# Patient Record
Sex: FEMALE | Race: BLACK OR AFRICAN AMERICAN | URBAN - METROPOLITAN AREA
[De-identification: names, ages, dates, MRNs, and addresses within clinical notes are randomized per-mention and may not be internally consistent; named-entity substitution may affect disease eponyms.]

---

## 2018-07-18 ENCOUNTER — OFFICE VISIT (OUTPATIENT)
Dept: OBGYN CLINIC | Age: 48
End: 2018-07-18

## 2018-07-18 VITALS
DIASTOLIC BLOOD PRESSURE: 85 MMHG | SYSTOLIC BLOOD PRESSURE: 130 MMHG | WEIGHT: 255 LBS | HEART RATE: 78 BPM | BODY MASS INDEX: 40.98 KG/M2 | HEIGHT: 66 IN

## 2018-07-18 DIAGNOSIS — R10.2 PELVIC PAIN IN FEMALE: Primary | ICD-10-CM

## 2018-07-18 PROBLEM — D21.9 FIBROIDS: Status: ACTIVE | Noted: 2018-07-18

## 2018-07-18 PROBLEM — E66.01 OBESITY, MORBID (HCC): Status: ACTIVE | Noted: 2018-07-18

## 2018-07-18 NOTE — MR AVS SNAPSHOT
[address] [facility] 
 
 
 [address] [id number] 
[phone] Patient: Philip Blancas MRN: [id number] FEJ:[birth year] Visit Information Date & Time Provider Department Dept. Phone Encounter #  
 7/18/2018 10:00 AM Karlton Fabry, [facility] OB/ [phone] Upcoming Health Maintenance Date Due  
 PAP AKA CERVICAL CYTOLOGY 4/3/1993 Influenza Age 5 to Adult 8/1/2018 Allergies as of 7/18/2018  Review Complete On: 7/18/2018 By: Karlton Fabry, MD  
 No Known Allergies Current Immunizations  Never Reviewed No immunizations on file. Not reviewed this visit Vitals BP Pulse Height(growth percentile) Weight(growth percentile) LMP BMI  
 130/85 78 5' 6\" (1.676 m) 255 lb (115.7 kg) 07/11/2018 (Exact Date) 41.16 kg/m2 Smoking Status Never Smoker BMI and BSA Data Body Mass Index Body Surface Area  
 41.16 kg/m 2 2.32 m 2 Your Updated Medication List  
  
Notice  As of 7/18/2018 11:13 AM  
 You have not been prescribed any medications. Introducing [facility]! [employer] introduces [employer] patient portal. Now you can access parts of your medical record, email your doctor's office, and request medication refills online. 1. In your internet browser, go to [email or web address] 2. Click on the First Time User? Click Here link in the Sign In box. You will see the New Member Sign Up page. 3. Enter your K[employer] Access Code exactly as it appears below. You will not need to use this code after youve completed the sign-up process. If you do not sign up before the expiration date, you must request a new code. · Kadoink Access Code: [email or web address] Expires: 10/16/2018  9:46 AM 
 
4. Enter the last four digits of your Social Security Number (xxxx) and Date of Birth (mm/dd/yyyy) as indicated and click Submit.  You will be taken to the next sign-up page. 5. Create a [employer] ID. This will be your [employer] login ID and cannot be changed, so think of one that is secure and easy to remember. 6. Create a [employer] password. You can change your password at any time. 7. Enter your Password Reset Question and Answer. This can be used at a later time if you forget your password. 8. Enter your e-mail address. You will receive e-mail notification when new information is available in [address] 9. Click Sign Up. You can now view and download portions of your medical record. 10. Click the Download Summary menu link to download a portable copy of your medical information. If you have questions, please visit the Frequently Asked Questions section of the [employer] website. Remember, [employer] is NOT to be used for urgent needs. For medical emergencies, dial 911. Now available from your iPhone and Android! Please provide this summary of care documentation to your next provider. If you have any questions after today's visit, please call [phone].

## 2018-07-18 NOTE — PROGRESS NOTES
58 y/o  presents to the office as a referral from her PCP for pelvic pain. She states she had waves of pain starting in May. She states she went PCP and she ordered tests to diagnose the pain. Ultrasound was ordered and performed and indicated her known fibroids were stable. She had embolization in . Follow-up MRI confirmed decreasing uterine volumes, which was confirmed. There has been no change in the interval size of her fibroids with the most dominant fibroid remains 2.3 cm. She denies any change in medications, activity or surgery. She isn't sexually active for the past several months. PCP work-up ruled out urinary cause. ABX was negative. ROS: intermittent pain, now resolved, difficulty inserting the tampon. + night sweats only. Normal, monthly cycles- described as light. Active Ambulatory Problems     Diagnosis Date Noted    Obesity, morbid ([address] 2018    Fibroids 2018     Resolved Ambulatory Problems     Diagnosis Date Noted    No Resolved Ambulatory Problems     Past Medical History:   Diagnosis Date    Fibroids 2018     Visit Vitals    /85    Pulse 78    Ht 5' 6\" (1.676 m)    Wt 255 lb (115.7 kg)    LMP 2018 (Exact Date)    BMI 41.16 kg/m2     Gen: A&Ox3, NAD  SVE: NEFG, cervix appearing. BME: mobile, non-tender uterus, 10 week size      Ultrasound: UTERUS: Normal in size measuring 8.2 x 5.5 x 4.3 cm. Multiple partially calcified fibroids are seen measuring up to 2.3 cm in size. ENDOMETRIUM: Normal in echotexture and thickness measuring 0.6 cm.    RIGHT OVARY and ADNEXA: Right ovary is normal in size, shape, and echotexture measuring 2.4 x 1.1 x 0.8 cm. 2.6 cm follicle noted. Color and spectral Doppler demonstrate normal flow to the right ovary with no evidence of ovarian torsion.  Arterial and venous waveforms are normal.    LEFT OVARY and ADNEXA: Left ovary is normal in size, shape, and echotexture measuring 3.6 x 2.6 x 1.7 cm. 1.5 cm follicle noted. Color and spectral Doppler demonstrate normal flow to the left ovary with no evidence of ovarian torsion. Arterial and venous waveforms are normal.    A/p:  Pelvic pain- nonspecific. Fibroids have not changed from prior scans. MRI reviewed. Recommend pain diary and RTO for WWE. The plan of care has been discussed with the patient. She has been given the opportunity to ask questions, which seem to have been answered satisfactorily.

## 2018-08-28 ENCOUNTER — OFFICE VISIT (OUTPATIENT)
Dept: OBGYN CLINIC | Age: 48
End: 2018-08-28

## 2018-08-28 ENCOUNTER — HOSPITAL ENCOUNTER (OUTPATIENT)
Dept: LAB | Age: 48
Discharge: HOME | End: 2018-08-28
Payer: COMMERCIAL

## 2018-08-28 VITALS
DIASTOLIC BLOOD PRESSURE: 84 MMHG | HEART RATE: 98 BPM | BODY MASS INDEX: 41.62 KG/M2 | WEIGHT: 259 LBS | HEIGHT: 66 IN | SYSTOLIC BLOOD PRESSURE: 130 MMHG

## 2018-08-28 DIAGNOSIS — Z01.419 ENCOUNTER FOR WELL WOMAN EXAM WITH ROUTINE GYNECOLOGICAL EXAM: Primary | ICD-10-CM

## 2018-08-28 DIAGNOSIS — N63.20 LEFT BREAST MASS: ICD-10-CM

## 2018-08-28 DIAGNOSIS — Z12.31 VISIT FOR SCREENING MAMMOGRAM: ICD-10-CM

## 2018-08-28 PROCEDURE — 87624 HPV HI-RISK TYP POOLED RSLT: CPT | Performed by: OBSTETRICS & GYNECOLOGY

## 2018-08-28 PROCEDURE — 88175 CYTOPATH C/V AUTO FLUID REDO: CPT | Performed by: OBSTETRICS & GYNECOLOGY

## 2018-08-28 NOTE — PATIENT INSTRUCTIONS
Learning About Menopause  What is menopause? For most women, menopause is a natural process of aging. Menstrual periods gradually stop. The ability to become pregnant ends. Some women feel relief that their childbearing years are ending. But other women struggle with the physical and emotional changes that come with menopause. For most women, menopause happens around age 48. But every woman's body has its own timeline. Some women stop having periods in their mid-40s. Others keep having periods well into their 50s. And some women go through menopause early because of cancer treatment or surgery to remove the ovaries. What can you expect with menopause? · It starts with perimenopause. This is the process of change that leads up to menopause. Perimenopause can start as early as your late 35s or as late as your early 46s. How long it lasts varies. But it usually lasts from 2 to 8 years. · During this time, your hormone levels will go up and down unevenly (fluctuate). This causes changes in your periods and other symptoms. In time, estrogen and progesterone levels drop enough that the menstrual cycle stops. Going a full year without having a period is usually considered menopause. · Low estrogen levels after menopause speed bone loss. This increases your risk of osteoporosis. Also, your risk of heart disease increases after menopause. · It's normal to have thinner, dryer, wrinkled skin after menopause. The vaginal lining and the lower urinary tract also thin and weaken. This can make it hard to have sex. It can also increase the risk of vaginal and urinary tract infections. What are the symptoms? · Lighter or heavier periods. Your menstrual cycle may be longer or shorter. You may skip periods. · Hot flashes. You may have a sudden feeling of intense body heat. You may sweat, and your head, neck, and chest may get red. Along with hot flashes, you may have a heartbeat that's too fast or not regular.  You may also feel anxious or grouchy. In rare cases you might feel panic. · Trouble sleeping. · Mood swings or feeling grouchy, depressed, or worried. · Problems with remembering or thinking clearly. · Vaginal dryness. Some women have only a few mild symptoms. Others have severe symptoms that disrupt their sleep and daily lives. Symptoms tend to last or get worse the first year or more after menopause. Over time, hormones even out at low levels. Many symptoms improve or go away. But some women may have symptoms that don't go away. How are menopause symptoms treated?   If you have mild symptoms, you may get some relief if you eat healthy foods, exercise, and lower your stress. Some women choose to take medicines if they have severe symptoms that aren't helped by making changes to their lifestyle.   Lifestyle changes    · Choose a heart-healthy diet that is low in saturated fat. It should include plenty of fish, fruits, vegetables, beans, and high-fiber grains and breads. Be sure you get enough calcium and vitamin D to help your bones stay strong. Low-fat or nonfat dairy products are a great source of calcium.     · Get regular exercise. Exercise can help you manage your weight, keep your heart and bones strong, and lift your mood.     · Limit caffeine, alcohol, and stress. These things can make symptoms worse. Limiting them may help you sleep better.     · If you smoke, stop. Quitting smoking can reduce hot flashes and long-term health risks. Medicines   If your symptoms are severe, talk with your doctor. You may want to try prescription medicines, such as:    · Low-dose birth control pills before menopause.     · Low-dose hormone therapy (HT) after menopause.     · Antidepressants.     · A medicine called clonidine (Catapres) that is usually used to treat high blood pressure.    All medicines for menopause symptoms have possible risks or side effects.  A very small number of women develop serious health problems when taking hormone therapy. Be sure to talk to your doctor about your possible health risks before you start a treatment for menopause symptoms.   Other treatments   You can try:    · Breathing exercises. They may help reduce hot flashes and emotional symptoms.     · Soy. Some women feel that eating lots of soy helps even out their menopause symptoms.     · Yoga or biofeedback. They may help reduce stress. Follow-up care is a key part of your treatment and safety. Be sure to make and go to all appointments, and call your doctor if you are having problems. It's also a good idea to know your test results and keep a list of the medicines you take. Where can you learn more? Go to http://adarsh-andrea.info/. Enter H199 in the search box to learn more about \"Learning About Menopause. \"  Current as of: October 6, 2017  Content Version: 11.7  © 1827-5417 [employer]. Care instructions adapted under license by [employer] (which disclaims liability or warranty for this information). If you have questions about a medical condition or this instruction, always ask your healthcare professional. [address] any warranty or liability for your use of this information.

## 2018-08-28 NOTE — MR AVS SNAPSHOT
[address] [facility] [address] [address] 
[phone] Patient: Deven Griffin MRN: [id number] DRI:[birth year] Visit Information Date & Time Provider Department Dept. Phone Encounter #  
 8/28/2018  3:00 PM MD Andrew [facility]son [facility] OB/-[phone] [id number] Upcoming Health Maintenance Date Due  
 PAP AKA CERVICAL CYTOLOGY 4/3/1993 Influenza Age 5 to Adult 8/1/2018 Allergies as of 8/28/2018  Review Complete On: 8/28/2018 By: Beatriz Metcalf MD  
 No Known Allergies Current Immunizations  Never Reviewed No immunizations on file. Not reviewed this visit You Were Diagnosed With   
  
 Codes Comments Encounter for well woman exam with routine gynecological exam    -  Primary ICD-10-CM: K90.285 ICD-9-CM: V72.31 Left breast mass     ICD-10-CM: N63.20 ICD-9-CM: 611.72 Visit for screening mammogram     ICD-10-CM: Z12.31 
ICD-9-CM: V76.12 Vitals BP Pulse Height(growth percentile) Weight(growth percentile) LMP BMI  
 130/84 98 5' 6\" (1.676 m) 259 lb (117.5 kg) 08/10/2018 (Exact Date) 41.8 kg/m2 Smoking Status Never Smoker Vitals History BMI and BSA Data Body Mass Index Body Surface Area  
 41.8 kg/m 2 2.34 m 2 Your Updated Medication List  
  
Notice  As of 8/28/2018  4:05 PM  
 You have not been prescribed any medications. To-Do List   
 08/28/2018 Imaging:  [facility] MAMMO BI SCREENING INCL CAD   
  
 08/28/2018 Imaging:  [facility] MAMMO LT DX INCL CAD Patient Instructions Learning About Menopause What is menopause? For most women, menopause is a natural process of aging. Menstrual periods gradually stop. The ability to become pregnant ends. Some women feel relief that their childbearing years are ending. But other women struggle with the physical and emotional changes that come with menopause. For most women, menopause happens around age 48. But every woman's body has its own timeline. Some women stop having periods in their mid-40s. Others keep having periods well into their 50s. And some women go through menopause early because of cancer treatment or surgery to remove the ovaries. What can you expect with menopause? · It starts with perimenopause. This is the process of change that leads up to menopause. Perimenopause can start as early as your late 35s or as late as your early 46s. How long it lasts varies. But it usually lasts from 2 to 8 years. · During this time, your hormone levels will go up and down unevenly (fluctuate). This causes changes in your periods and other symptoms. In time, estrogen and progesterone levels drop enough that the menstrual cycle stops. Going a full year without having a period is usually considered menopause. · Low estrogen levels after menopause speed bone loss. This increases your risk of osteoporosis. Also, your risk of heart disease increases after menopause. · It's normal to have thinner, dryer, wrinkled skin after menopause. The vaginal lining and the lower urinary tract also thin and weaken. This can make it hard to have sex. It can also increase the risk of vaginal and urinary tract infections. What are the symptoms? · Lighter or heavier periods. Your menstrual cycle may be longer or shorter. You may skip periods. · Hot flashes. You may have a sudden feeling of intense body heat. You may sweat, and your head, neck, and chest may get red. Along with hot flashes, you may have a heartbeat that's too fast or not regular. You may also feel anxious or grouchy. In rare cases you might feel panic. · Trouble sleeping. · Mood swings or feeling grouchy, depressed, or worried. · Problems with remembering or thinking clearly. · Vaginal dryness. Some women have only a few mild symptoms.  Others have severe symptoms that disrupt their sleep and daily lives. Symptoms tend to last or get worse the first year or more after menopause. Over time, hormones even out at low levels. Many symptoms improve or go away. But some women may have symptoms that don't go away. How are menopause symptoms treated? 
 If you have mild symptoms, you may get some relief if you eat healthy foods, exercise, and lower your stress. Some women choose to take medicines if they have severe symptoms that aren't helped by making changes to their lifestyle. 
 Lifestyle changes 
  · Choose a heart-healthy diet that is low in saturated fat. It should include plenty of fish, fruits, vegetables, beans, and high-fiber grains and breads. Be sure you get enough calcium and vitamin D to help your bones stay strong. Low-fat or nonfat dairy products are a great source of calcium.  
  · Get regular exercise. Exercise can help you manage your weight, keep your heart and bones strong, and lift your mood.  
  · Limit caffeine, alcohol, and stress. These things can make symptoms worse. Limiting them may help you sleep better.  
  · If you smoke, stop. Quitting smoking can reduce hot flashes and long-term health risks. Medicines 
 If your symptoms are severe, talk with your doctor. You may want to try prescription medicines, such as: 
  · Low-dose birth control pills before menopause.  
  · Low-dose hormone therapy (HT) after menopause.  
  · Antidepressants.  
  · A medicine called clonidine (Catapres) that is usually used to treat high blood pressure.  
 All medicines for menopause symptoms have possible risks or side effects. A very small number of women develop serious health problems when taking hormone therapy. Be sure to talk to your doctor about your possible health risks before you start a treatment for menopause symptoms. 
 Other treatments 
 You can try: 
  · Breathing exercises. They may help reduce hot flashes and emotional symptoms.   · Soy. Some women feel that eating lots of soy helps even out their menopause symptoms.  
  · Yoga or biofeedback. They may help reduce stress. Follow-up care is a key part of your treatment and safety. Be sure to make and go to all appointments, and call your doctor if you are having problems. It's also a good idea to know your test results and keep a list of the medicines you take. Where can you learn more? Go to http://adarsh-andrea.info/. Enter H199 in the search box to learn more about \"Learning About Menopause. \" Current as of: October 6, 2017 Content Version: 11.7 © 8949-3086 [employer]. Care instructions adapted under license by "[employer] (which disclaims liability or warranty for this information). If you have questions about a medical condition or this instruction, always ask your healthcare professional. [address] any warranty or liability for your use of this information. Introducing [facility]! [employer] introduces [employer] patient portal. Now you can access parts of your medical record, email your doctor's office, and request medication refills online. 1. In your internet browser, go to [email or web address] 2. Click on the First Time User? Click Here link in the Sign In box. You will see the New Member Sign Up page. 3. Enter your [employer] Access Code exactly as it appears below. You will not need to use this code after youve completed the sign-up process. If you do not sign up before the expiration date, you must request a new code. · [employer] Access Code: [email or web address] Expires: 10/16/2018  9:46 AM 
 
4. Enter the last four digits of your Social Security Number (xxxx) and Date of Birth (mm/dd/yyyy) as indicated and click Submit. You will be taken to the next sign-up page. 5. Create a [employer] ID.  This will be your [employer] login ID and cannot be changed, so think of one that is secure and easy to remember. 6. Create a [employer] password. You can change your password at any time. 7. Enter your Password Reset Question and Answer. This can be used at a later time if you forget your password. 8. Enter your e-mail address. You will receive e-mail notification when new information is available in [address] 9. Click Sign Up. You can now view and download portions of your medical record. 10. Click the Download Summary menu link to download a portable copy of your medical information. If you have questions, please visit the Frequently Asked Questions section of the [employer] website. Remember, [employer] is NOT to be used for urgent needs. For medical emergencies, dial 911. Now available from your iPhone and Android! Please provide this summary of care documentation to your next provider. Your primary care clinician is listed as Marylee Alley. If you have any questions after today's visit, please call [phone].

## 2018-08-28 NOTE — PROGRESS NOTES
Subjective:   57 y.o. female for Well Woman Check. She notes a mass on the left breast and is concerned. Patient's last menstrual period was 08/10/2018 (exact date). Social History: not sexually active. Pertinent past medical hstory: no history of HTN, DVT, CAD, DM, liver disease, migraines or smoking. Patient Active Problem List   Diagnosis Code    Ob[facility] morbid ([facility] E66.01    Fibroids D25.9       No Known Allergies  Past Medical History:   Diagnosis Date    Fibroids 7/18/2018     History reviewed. No pertinent surgical history. Family History   Problem Relation Age of Onset    Diabetes Mother     Stroke Mother     Cancer Father      throat    Hypertension Brother     Diabetes Brother     Stroke Brother     Cancer Maternal Grandmother      stomach    Heart Disease Paternal Grandmother      Social History   Substance Use Topics    Smoking status: Never Smoker    Smokeless tobacco: Never Used    Alcohol use No        ROS:  Feeling well. No dyspnea or chest pain on exertion. No abdominal pain, change in bowel habits, black or bloody stools. No urinary tract symptoms. GYN ROS: normal menses, no abnormal bleeding, pelvic pain or discharge, no breast pain or new or enlarging lumps on self exam. No neurological complaints. Objective:     Visit Vitals    /84    Pulse 98    Ht 5' 6\" (1.676 m)    Wt 259 lb (117.5 kg)    LMP 08/10/2018 (Exact Date)    BMI 41.8 kg/m2     The patient appears well, alert, oriented x 3, in no distress. ENT normal.  Neck supple. No adenopathy or thyromegaly. JANEE. Lungs are clear, good air entry, no wheezes, rhonchi or rales. S1 and S2 normal, no murmurs, regular rate and rhythm. Abdomen soft without tenderness, guarding, mass or organomegaly. Extremities show no edema, normal peripheral pulses. Neurological is normal, no focal findings.     BREAST EXAM: left abnormal mass palpable near sternum and superficial.    PELVIC EXAM: normal external genitalia, vulva, vagina, cervix, uterus enlarged with fibroids and adnexa not palpable. Assessment/Plan:   well woman- known fibroids- asymptomatic.  mammogram  pap smear  counseled on breast self exam, menopause and adequate intake of calcium and vitamin D  return annually or prn    ICD-10-CM ICD-9-CM    1. Encounter for well woman exam with routine gynecological exam Z01.419 V72.31 PAP IG, APTIMA HPV AND RFX 16/18,45 ([id number])      PAP IG, APTIMA HPV AND RFX 16/18,45 ([id number])   2. Left breast mass N63.20 611.72 [facility] MAMMO LT DX INCL CAD   3. Visit for screening mammogram Z12.31 V76.12 [facility] MAMMO BI SCREENING INCL CAD   .

## 2018-08-31 DIAGNOSIS — B96.89 BV (BACTERIAL VAGINOSIS): Primary | ICD-10-CM

## 2018-08-31 DIAGNOSIS — N76.0 BV (BACTERIAL VAGINOSIS): Primary | ICD-10-CM

## 2018-08-31 RX ORDER — METRONIDAZOLE 500 MG/1
500 TABLET ORAL 2 TIMES DAILY
Qty: 10 TAB | Refills: 0 | Status: SHIPPED | OUTPATIENT
Start: 2018-08-31 | End: 2018-09-05

## 2018-08-31 NOTE — PROGRESS NOTES
Normal pap with negative HPV. Letter- next pap in 5 years. BV noted: Flagyl 500 mg PO BID x 5 days.  
Please inform

## 2018-09-07 ENCOUNTER — TELEPHONE (OUTPATIENT)
Dept: OBGYN CLINIC | Age: 48
End: 2018-09-07

## 2018-09-07 NOTE — TELEPHONE ENCOUNTER
.called this patient concerning pap results  ,that are normal ,patient aware that RX has been sent to her Pharmacy